# Patient Record
Sex: MALE | Race: WHITE | NOT HISPANIC OR LATINO | Employment: FULL TIME | URBAN - METROPOLITAN AREA
[De-identification: names, ages, dates, MRNs, and addresses within clinical notes are randomized per-mention and may not be internally consistent; named-entity substitution may affect disease eponyms.]

---

## 2024-02-19 ENCOUNTER — APPOINTMENT (OUTPATIENT)
Dept: GENERAL RADIOLOGY | Facility: CLINIC | Age: 24
End: 2024-02-19
Attending: EMERGENCY MEDICINE
Payer: COMMERCIAL

## 2024-02-19 VITALS
HEART RATE: 75 BPM | HEIGHT: 67 IN | TEMPERATURE: 97.6 F | RESPIRATION RATE: 16 BRPM | SYSTOLIC BLOOD PRESSURE: 137 MMHG | BODY MASS INDEX: 18.83 KG/M2 | DIASTOLIC BLOOD PRESSURE: 58 MMHG | OXYGEN SATURATION: 100 % | WEIGHT: 120 LBS

## 2024-02-19 PROCEDURE — 99283 EMERGENCY DEPT VISIT LOW MDM: CPT | Mod: 25

## 2024-02-19 PROCEDURE — 73630 X-RAY EXAM OF FOOT: CPT | Mod: RT

## 2024-02-20 ENCOUNTER — HOSPITAL ENCOUNTER (EMERGENCY)
Facility: CLINIC | Age: 24
Discharge: HOME | End: 2024-02-20
Attending: EMERGENCY MEDICINE | Admitting: EMERGENCY MEDICINE
Payer: COMMERCIAL

## 2024-02-20 DIAGNOSIS — T14.8XXA PUNCTURE WOUND: ICD-10-CM
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
## 2024-02-20 PROCEDURE — 250N000011 HC RX IP 250 OP 636: Performed by: EMERGENCY MEDICINE

## 2024-02-20 PROCEDURE — 90471 IMMUNIZATION ADMIN: CPT | Performed by: EMERGENCY MEDICINE

## 2024-02-20 PROCEDURE — 90715 TDAP VACCINE 7 YRS/> IM: CPT | Performed by: EMERGENCY MEDICINE

## 2024-02-20 RX ADMIN — CLOSTRIDIUM TETANI TOXOID ANTIGEN (FORMALDEHYDE INACTIVATED), CORYNEBACTERIUM DIPHTHERIAE TOXOID ANTIGEN (FORMALDEHYDE INACTIVATED), BORDETELLA PERTUSSIS TOXOID ANTIGEN (GLUTARALDEHYDE INACTIVATED), BORDETELLA PERTUSSIS FILAMENTOUS HEMAGGLUTININ ANTIGEN (FORMALDEHYDE INACTIVATED), BORDETELLA PERTUSSIS PERTACTIN ANTIGEN, AND BORDETELLA PERTUSSIS FIMBRIAE 2/3 ANTIGEN 0.5 ML: 5; 2; 2.5; 5; 3; 5 INJECTION, SUSPENSION INTRAMUSCULAR at 02:12

## 2024-02-20 NOTE — ED PROVIDER NOTES
"    History     Chief Complaint:  Wound Check       The history is provided by the patient.      Merna Damon is a healthy 23 year old male presenting with his family for evaluation of a wound.  He was at work when he stepped on a new nail.  It did go through his shoe but when he felt the pain he stopped stepping so he does not feel it was a very deep injury.  The nail did not remain embedded in his foot.  He denies any other concerns or complaints.  He does not recall when his last tetanus was.    Independent Historian:    As above    Review of External Notes:  Preventative healthcare exam 11/29/2022    Medications:    [facility] COMPLETE OR    Past Medical History:    Past Medical History:   Diagnosis Date    Nose bleeds     Skull fracture (H) 12-08     Past Surgical History:    Past Surgical History:   Procedure Laterality Date    CIRCUMCISION,OTHER,28+ D/O      7 yo      Physical Exam   Patient Vitals for the past 24 hrs:   BP Temp Temp src Pulse Resp SpO2 Height Weight   02/19/24 2342 137/58 97.6  F (36.4  C) Temporal 75 16 100 % 1.702 m (5' 7\") 54.4 kg (120 lb)      Physical Exam  General: WD/WN; well appearing young man; cooperative  Head:  Atraumatic  Eyes:  Conjunctivae, lids, and sclerae are normal  ENT:    Normal nose; MMM  Neck:  Supple; normal ROM  Resp:  No respiratory distress  GI:  Nondistended    MS:  Normal ROM  Skin:  Warm; non-diaphoretic; no pallor; there is a tiny superficial puncture wound on the plantar surface of the right foot as photographed below, hemostatic, no inflammatory changes  Neuro:  Awake; A&Ox3  Psych: Normal mood and affect; normal speech  Vitals reviewed.        Emergency Department Course   Imaging:  Foot  XR, G/E 3 views, right   Final Result   IMPRESSION:    1. No radiopaque foreign object in the right foot.   2. No visualized acute fracture, malalignment or other acute osseous abnormality of the right foot.         Emergency Department Course & " Assessments:  Interventions:  Medications   Tdap (tetanus-diphtheria-acell pertussis) (ADACEL) injection 0.5 mL (0.5 mLs Intramuscular $Given 2/20/24 6420)     Independent Interpretation (X-rays, CTs, rhythm strip):  I independently interpreted the x-ray and see no foreign body.    Consultations/Discussion of Management or Tests:  Not applicable    Social Determinants of Health affecting care:  Employed  Supportive significant other  No established PCP     Disposition:  Discharged.    Impression & Plan    Medical Decision Making:  Merna is a 23 year old man who was at work when he stepped on a unused, new nail.  It did go through his shoe and punctured his foot but he felt it and did not step down the entire way. The nail did not embed in his foot.  He is well-appearing on exam.  There is a very superficial puncture wound on the plantar surface of his right foot as photographed above without inflammatory changes or bleeding.  X-ray does not reveal any retained foreign body or other acute pathology.  His tetanus was updated and the wound was dressed.  We did discuss antibiotics, but I think that they can safely be deferred given this was a new nail and the puncture wound is very superficial.  He is agreeable and understands to check it daily for signs of infection.  If he sees these or has any other concerns he will return to the emergency department.  He will use Tylenol or ibuprofen as needed for pain and follow-up for wound check.  All questions answered.  Amenable to discharge.    Diagnosis:    ICD-10-CM    1. Puncture wound  T14.8XXA       2. Work related injury  Y99.0            Discharge Medications:  Discharge Medication List as of 2/20/2024  2:14 AM         2/20/2024   Karin Majano MD Dixson, Kylie S, MD  02/26/24 7258

## 2024-02-20 NOTE — DISCHARGE INSTRUCTIONS
Tylenol or ibuprofen as needed for pain.  Check the wound at least once daily to evaluate for signs of infection such as redness or discharge.  If you have increased pain this can also be a sign of infection.  I would also watch for fever greater than 100.4  F or vomiting.  If any of these occur or you have other concerns, return to the emergency department.  Otherwise, I have provided you with the [facility] to establish care and have a wound recheck in 2 to 3 days.

## 2024-02-20 NOTE — ED TRIAGE NOTES
Pt arrives via triage after stepping on nail at work. Small puncture wound to bottom of rt foot, bleeding controlled. Pt states he is up to date on tetanus.      Triage Assessment (Adult)       Row Name 02/19/24 8046          Triage Assessment    Airway WDL WDL        Respiratory WDL    Respiratory WDL WDL        Skin Circulation/Temperature WDL    Skin Circulation/Temperature WDL WDL        Cardiac WDL    Cardiac WDL WDL        Peripheral/Neurovascular WDL    Peripheral Neurovascular WDL WDL        Cognitive/Neuro/Behavioral WDL    Cognitive/Neuro/Behavioral WDL WDL

## 2024-04-21 ENCOUNTER — HEALTH MAINTENANCE LETTER (OUTPATIENT)
Age: 24
End: 2024-04-21

## 2025-05-11 ENCOUNTER — HEALTH MAINTENANCE LETTER (OUTPATIENT)
Age: 25
End: 2025-05-11